# Patient Record
Sex: FEMALE | Race: WHITE | NOT HISPANIC OR LATINO | URBAN - NONMETROPOLITAN AREA
[De-identification: names, ages, dates, MRNs, and addresses within clinical notes are randomized per-mention and may not be internally consistent; named-entity substitution may affect disease eponyms.]

---

## 2018-05-11 NOTE — PATIENT DISCUSSION
Message sent to scheduling and abel to place order and set up appt.     ----- Message from Sheryl Webster MD sent at 9/15/2017  7:19 PM EDT -----  Please schedule a left diagnostic mammogram in 1 week with Spot magnification views and schedule patient to be seen by me in 2 weeks or early part of 3 weeks.  Sheryl Webster MD     Retinal tear and detachment warning symptoms reviewed and patient instructed to call immediately if increasing floaters, flashes, or decreasing peripheral vision.

## 2019-05-21 NOTE — PROCEDURE NOTE: SURGICAL
<p>Prior to commencing surgery patient identification, surgical procedure, site, and side were confirmed by Dr. Ayad Kim. Following topical proparacaine anesthesia, the patient was positioned at the YAG laser, a contact lens coupled to the cornea with methylcellulose and an axial posterior capsulotomy performed without complication using 3.1 Mj x 30. Excess methylcellulose was washed from the eye, one drop of Alphagan was instilled and the patient returned to the holding area having tolerated the procedure well and without complication. </p><p>MRN [id number]</p>

## 2021-04-08 ENCOUNTER — IMPORTED ENCOUNTER (OUTPATIENT)
Dept: URBAN - NONMETROPOLITAN AREA CLINIC 1 | Facility: CLINIC | Age: 21
End: 2021-04-08

## 2021-04-08 PROBLEM — H52.13: Noted: 2021-04-08

## 2021-04-08 PROBLEM — H52.223: Noted: 2021-04-08

## 2021-04-08 PROCEDURE — 92004 COMPRE OPH EXAM NEW PT 1/>: CPT

## 2021-04-08 PROCEDURE — 92015 DETERMINE REFRACTIVE STATE: CPT

## 2021-04-08 NOTE — PATIENT DISCUSSION
Compound Myopic Astigmatism OU-  discussed findings w/patient-  new spectacle Rx issued-  continue to monitor yearly or prn; 's Notes: MR 4/8/2021DFE 4/8/2021

## 2022-04-09 ASSESSMENT — VISUAL ACUITY
OU_CC: 20/20
OS_SC: 20/20
OD_SC: 20/20-1

## 2022-04-09 ASSESSMENT — TONOMETRY
OD_IOP_MMHG: 15
OS_IOP_MMHG: 16
